# Patient Record
Sex: FEMALE
[De-identification: names, ages, dates, MRNs, and addresses within clinical notes are randomized per-mention and may not be internally consistent; named-entity substitution may affect disease eponyms.]

---

## 2021-06-23 PROBLEM — Z00.00 ENCOUNTER FOR PREVENTIVE HEALTH EXAMINATION: Status: ACTIVE | Noted: 2021-06-23

## 2021-08-12 ENCOUNTER — TRANSCRIPTION ENCOUNTER (OUTPATIENT)
Age: 41
End: 2021-08-12

## 2021-08-12 ENCOUNTER — APPOINTMENT (OUTPATIENT)
Dept: PEDIATRIC ALLERGY IMMUNOLOGY | Facility: CLINIC | Age: 41
End: 2021-08-12
Payer: COMMERCIAL

## 2021-08-12 VITALS
WEIGHT: 158 LBS | HEART RATE: 71 BPM | OXYGEN SATURATION: 98 % | BODY MASS INDEX: 23.4 KG/M2 | SYSTOLIC BLOOD PRESSURE: 115 MMHG | HEIGHT: 69 IN | TEMPERATURE: 96.2 F | DIASTOLIC BLOOD PRESSURE: 79 MMHG

## 2021-08-12 LAB
CD16+CD56+ CELLS # BLD: 144 /UL
CD16+CD56+ CELLS NFR BLD: 8 %
CD19 CELLS NFR BLD: 198 /UL
CD3 CELLS # BLD: 1323 /UL
CD3 CELLS NFR BLD: 79 %
CD3+CD4+ CELLS # BLD: 848 /UL
CD3+CD4+ CELLS NFR BLD: 52 %
CD3+CD4+ CELLS/CD3+CD8+ CLL SPEC: 1.93 RATIO
CD3+CD8+ CELLS # SPEC: 439 /UL
CD3+CD8+ CELLS NFR BLD: 27 %
CELLS.CD3-CD19+/CELLS IN BLOOD: 12 %

## 2021-08-12 PROCEDURE — 36415 COLL VENOUS BLD VENIPUNCTURE: CPT

## 2021-08-12 PROCEDURE — 99204 OFFICE O/P NEW MOD 45 MIN: CPT | Mod: 25

## 2021-08-12 RX ORDER — CETIRIZINE HYDROCHLORIDE 10 MG/1
10 CAPSULE, LIQUID FILLED ORAL
Refills: 0 | Status: ACTIVE | COMMUNITY
Start: 2021-08-12

## 2021-08-12 NOTE — REVIEW OF SYSTEMS
[Fatigue] : fatigue [Eye Itching] : itchy eyes [Nasal Congestion] : nasal congestion [Sneezing] : sneezing [Nl] : Genitourinary

## 2021-08-13 ENCOUNTER — NON-APPOINTMENT (OUTPATIENT)
Age: 41
End: 2021-08-13

## 2021-08-13 LAB
BASOPHILS # BLD AUTO: 0.02 K/UL
BASOPHILS NFR BLD AUTO: 0.4 %
CH50 SERPL-MCNC: 92 U/ML
DEPRECATED KAPPA LC FREE/LAMBDA SER: 0.88 RATIO
EOSINOPHIL # BLD AUTO: 0.17 K/UL
EOSINOPHIL NFR BLD AUTO: 3.2 %
HCT VFR BLD CALC: 40 %
HGB BLD-MCNC: 13.6 G/DL
IGA SER QL IEP: 170 MG/DL
IGG SER QL IEP: 1037 MG/DL
IGM SER QL IEP: 90 MG/DL
IMM GRANULOCYTES NFR BLD AUTO: 0.2 %
KAPPA LC CSF-MCNC: 1.01 MG/DL
KAPPA LC SERPL-MCNC: 0.89 MG/DL
LYMPHOCYTES # BLD AUTO: 1.82 K/UL
LYMPHOCYTES NFR BLD AUTO: 34.3 %
MAN DIFF?: NORMAL
MCHC RBC-ENTMCNC: 30.6 PG
MCHC RBC-ENTMCNC: 34 GM/DL
MCV RBC AUTO: 89.9 FL
MEV IGG FLD QL IA: 150 AU/ML
MEV IGG+IGM SER-IMP: POSITIVE
MONOCYTES # BLD AUTO: 0.4 K/UL
MONOCYTES NFR BLD AUTO: 7.5 %
MUV AB SER-ACNC: POSITIVE
MUV IGG SER QL IA: >300 AU/ML
NEUTROPHILS # BLD AUTO: 2.88 K/UL
NEUTROPHILS NFR BLD AUTO: 54.4 %
PLATELET # BLD AUTO: 259 K/UL
RBC # BLD: 4.45 M/UL
RBC # FLD: 13.3 %
RUBV IGG FLD-ACNC: 5.6 INDEX
RUBV IGG SER-IMP: POSITIVE
VZV AB TITR SER: POSITIVE
VZV IGG SER IF-ACNC: 3187 INDEX
WBC # FLD AUTO: 5.3 K/UL

## 2021-08-13 NOTE — SOCIAL HISTORY
[House] : [unfilled] lives in a house  [Central Forced Air] : heating provided by central forced air [Dry] : dry [Dust Mite Covers] : has dust mite covers [Dog] : dog [] :  [Spouse/Partner] : spouse/partner [College] : College [Dehumidifier] : does not use a dehumidifier [Feather Pillows] : does not have feather pillows [Feather Comforter] : does not have a feather comforter [Smokers in Household] : there are no smokers in the home [de-identified] : [place], with just chickens at the moment [de-identified] : just area rugs [de-identified] : shorthaired  [de-identified] : hypoallergienic detergents

## 2021-08-13 NOTE — PHYSICAL EXAM
[Alert] : alert [Well Nourished] : well nourished [Healthy Appearance] : healthy appearance [No Acute Distress] : no acute distress [Well Developed] : well developed [Normal Pupil & Iris Size/Symmetry] : normal pupil and iris size and symmetry [No Discharge] : no discharge [No Photophobia] : no photophobia [Sclera Not Icteric] : sclera not icteric [Normal TMs] : both tympanic membranes were normal [Normal Nasal Mucosa] : the nasal mucosa was normal [Normal Lips/Tongue] : the lips and tongue were normal [Normal Outer Ear/Nose] : the ears and nose were normal in appearance [Normal Tonsils] : normal tonsils [No Thrush] : no thrush [Supple] : the neck was supple [Normal Rate and Effort] : normal respiratory rhythm and effort [No Crackles] : no crackles [No Retractions] : no retractions [Bilateral Audible Breath Sounds] : bilateral audible breath sounds [Normal Rate] : heart rate was normal  [Normal S1, S2] : normal S1 and S2 [No murmur] : no murmur [Regular Rhythm] : with a regular rhythm [Soft] : abdomen soft [Not Tender] : non-tender [Not Distended] : not distended [No HSM] : no hepato-splenomegaly [Normal Cervical Lymph Nodes] : cervical [Skin Intact] : skin intact  [No Rash] : no rash [No Skin Lesions] : no skin lesions [No clubbing] : no clubbing [No Edema] : no edema [No Cyanosis] : no cyanosis [Normal Mood] : mood was normal [Normal Affect] : affect was normal [Alert, Awake, Oriented as Age-Appropriate] : alert, awake, oriented as age appropriate [No Motor Deficits] : the motor exam was normal [Conjunctival Erythema] : no conjunctival erythema [Suborbital Bogginess] : no suborbital bogginess (allergic shiners) [Pale mucosa] : no pale mucosa [Boggy Nasal Turbinates] : no boggy and/or pale nasal turbinates [Wheezing] : no wheezing was heard

## 2021-08-13 NOTE — REASON FOR VISIT
[Initial Consultation] : an initial consultation for [Immune Evaluation] : immune evaluation [FreeTextEntry2] : immune evaluation

## 2021-08-13 NOTE — CONSULT LETTER
[Dear  ___] : Dear  [unfilled], [Consult Letter:] : I had the pleasure of evaluating your patient, [unfilled]. [Please see my note below.] : Please see my note below. [Consult Closing:] : Thank you very much for allowing me to participate in the care of this patient.  If you have any questions, please do not hesitate to contact me. [Sincerely,] : Sincerely, [FreeTextEntry3] : Desiree Baumann MD, MPH\par Fellow, Division of Allergy and Immunology\par [facility]\par \par Wang Sanchez III  MPH, MD, PhD, FACP, FACAAI, FAAAAI \par , Departments of Medicine and Pediatrics \par [facility] \par , [facility] \par Attending Physician, Division of Allergy & Immunology [facility]\par \par \par \par [facility]

## 2021-08-13 NOTE — HISTORY OF PRESENT ILLNESS
[Dust Mites] : dust mites [Dog] : dog [Cat] : cat [Trees] : trees [Grasses] : grasses [Weeds] : weeds [de-identified] : CASSIE MCLAIN is a 40 year year old female with PCOS, unknown cause infertility, who presents for  evaluation of immune system. She was referred by a friend.\par francisco As a child, around 8yo, had chicken pox. Allergies started after that, specifically to yellow dye number 5, which has since resolved. Received allergy shots for environmental allergies, but did not really help. Had an albuterol nebulizer when younger, but never diagnosed with asthma. Had mononucleosis in college, after which started to be allergic to cats and dogs and developed sensitivity to gluten. Within hours of days of eating gluten, she gets sinus infections. Required antibiotics about every 6 weeks for sinusitis, but ever since cutting out gluten 3 years ago, only takes antibiotics about once a year.  In 2007, had sinus surgery for recurrent sinusitis, and still follows with ENT, Dr. Marcio Lopez ([place]).  \par \andrew Has seen an allergist as an adult, about 10 years ago. Tried again to take allergy shots, but did not work. Was retested earlier this year with multiple environmental allergies, records to be sent over. Gets itchy, runny eyes, itchy throat with environmental allergies. Gets hives from dogs. \par Medication allergies - avalox (mixofloxacin, muscle weakness), pneumonia vaccine (2010; lymph nodes swelled up to golf ball size for 5 weeks, eventually self-resolved), Allegra (misses periods the month she takes it, same reaction in mother), ganarelex (IVF medications, localized swelling, redness, eye/ear swelling, itching). Has never had an epi pen, has never gone to ED for allergic symptoms. Will take benadryl at home and sleep off any symptoms. \par \par Infection history:\par Recurrent sinusitis, including one episode of strep B and fungal infection in her sinuses, that required IV abx.\andrew Used to get frequent yeast infections, several times a year, Resolved after stopped eating gluten. \andrew Had thrombophlebitis years ago, resolved with antibiotics.  \par \andrew Has PCOS. Had 1 known miscarriage, then tried IVF with successful retrievals. Has not yet been able to successfully carry IVF through to implantation stage. \par \andrew Has not seen a GI doctor. Gets bloating after eating gluten. No cardiologist. Does have palpitations, chest pain only while on IVF medications and hormones. Resolved once off. Has not seen a rheumatologist. Knees or shoulder pain occasionally. \par Has not seen a neurologist. Denies dizziness, flushing, nausea, vomiting, diarrhea. \par \par High risk COVID19 vaccine questions:\par Unsure if allergic to components of COVID vaccine\par Unsure if ever exposed to miralax (PEG) or polysorbate\par reaction to prior vaccines (pneumonia) - cervical lymphadenopathy, "golf ball sized" that persisted 5 weeks\par No known reactions to injectables or fillers \par \par Mom with many allergies to food, drugs, environmental, she had asthma as a child. Brother with allergies, twice he had severe pneumonia they think was precipitated by allergies.  [de-identified] : gluten, sugar

## 2021-08-14 LAB
C DIPHTHERIAE AB SER QL: 0.19 IU/ML
C TETANI IGG SER-ACNC: 6.24 IU/ML
HAEM INFLU B AB SER-MCNC: 4.36 UG/ML

## 2021-08-16 ENCOUNTER — NON-APPOINTMENT (OUTPATIENT)
Age: 41
End: 2021-08-16

## 2021-08-18 LAB
COMPLEMENT, ALTERNATE PATHWAY (AH50): 106
DEPRECATED S PNEUM 1 IGG SER-MCNC: 28.9 MCG/ML
DEPRECATED S PNEUM12 AB SER-ACNC: 5.4 MCG/ML
DEPRECATED S PNEUM14 AB SER-ACNC: 21.7 MCG/ML
DEPRECATED S PNEUM17 IGG SER IA-MCNC: 2.4 MCG/ML
DEPRECATED S PNEUM18 IGG SER IA-MCNC: 3.1 MCG/ML
DEPRECATED S PNEUM19 IGG SER-MCNC: 15.2 MCG/ML
DEPRECATED S PNEUM19 IGG SER-MCNC: 4.2 MCG/ML
DEPRECATED S PNEUM2 IGG SER-MCNC: 15.8 MCG/ML
DEPRECATED S PNEUM20 IGG SER-MCNC: 1.4 MCG/ML
DEPRECATED S PNEUM22 IGG SER-MCNC: 31.5 MCG/ML
DEPRECATED S PNEUM23 AB SER-ACNC: 36.9 MCG/ML
DEPRECATED S PNEUM3 AB SER-ACNC: <0.4 MCG/ML
DEPRECATED S PNEUM34 IGG SER-MCNC: 4.9 MCG/ML
DEPRECATED S PNEUM4 AB SER-ACNC: 1.1 MCG/ML
DEPRECATED S PNEUM5 IGG SER-MCNC: 24.7 MCG/ML
DEPRECATED S PNEUM6 IGG SER-MCNC: 1.3 MCG/ML
DEPRECATED S PNEUM7 IGG SER-ACNC: 2.7 MCG/ML
DEPRECATED S PNEUM8 AB SER-ACNC: 2.6 MCG/ML
DEPRECATED S PNEUM9 AB SER-ACNC: NORMAL
DEPRECATED S PNEUM9 IGG SER-MCNC: 3.7 MCG/ML
LPT PW BLD-NRATE: NORMAL
STREPTOCOCCUS PNEUMONIAE SEROTYPE 11A: 5.4 MCG/ML
STREPTOCOCCUS PNEUMONIAE SEROTYPE 15B: 3.1 MCG/ML
STREPTOCOCCUS PNEUMONIAE SEROTYPE 33F: 1.7 MCG/ML

## 2021-08-19 LAB — IGE SER-MCNC: 111 KU/L

## 2021-08-23 ENCOUNTER — NON-APPOINTMENT (OUTPATIENT)
Age: 41
End: 2021-08-23

## 2021-08-24 LAB — MANNAN BINDING LECTIN (MBL): >4000 NG/ML

## 2021-08-25 ENCOUNTER — TRANSCRIPTION ENCOUNTER (OUTPATIENT)
Age: 41
End: 2021-08-25

## 2021-08-26 ENCOUNTER — APPOINTMENT (OUTPATIENT)
Dept: PEDIATRIC ALLERGY IMMUNOLOGY | Facility: CLINIC | Age: 41
End: 2021-08-26
Payer: COMMERCIAL

## 2021-08-26 DIAGNOSIS — Z13.0 ENCOUNTER FOR SCREENING FOR OTHER SUSPECTED ENDOCRINE DISORDER: ICD-10-CM

## 2021-08-26 DIAGNOSIS — Z13.29 ENCOUNTER FOR SCREENING FOR OTHER SUSPECTED ENDOCRINE DISORDER: ICD-10-CM

## 2021-08-26 DIAGNOSIS — Z13.228 ENCOUNTER FOR SCREENING FOR OTHER SUSPECTED ENDOCRINE DISORDER: ICD-10-CM

## 2021-08-26 PROCEDURE — 36415 COLL VENOUS BLD VENIPUNCTURE: CPT

## 2021-08-27 PROBLEM — Z13.29 SCREENING FOR ENDOCRINE, METABOLIC AND IMMUNITY DISORDER: Status: ACTIVE | Noted: 2021-08-12

## 2021-09-01 ENCOUNTER — APPOINTMENT (OUTPATIENT)
Dept: PEDIATRIC ALLERGY IMMUNOLOGY | Facility: CLINIC | Age: 41
End: 2021-09-01
Payer: COMMERCIAL

## 2021-09-01 DIAGNOSIS — J30.9 ALLERGIC RHINITIS, UNSPECIFIED: ICD-10-CM

## 2021-09-01 DIAGNOSIS — Z71.89 OTHER SPECIFIED COUNSELING: ICD-10-CM

## 2021-09-01 PROCEDURE — 99213 OFFICE O/P EST LOW 20 MIN: CPT | Mod: 95

## 2021-09-03 ENCOUNTER — APPOINTMENT (OUTPATIENT)
Dept: PEDIATRIC PULMONARY CYSTIC FIB | Facility: CLINIC | Age: 41
End: 2021-09-03

## 2021-09-03 ENCOUNTER — NON-APPOINTMENT (OUTPATIENT)
Age: 41
End: 2021-09-03

## 2021-09-19 PROBLEM — Z71.89 VACCINE COUNSELING: Status: ACTIVE | Noted: 2021-08-12

## 2021-09-19 PROBLEM — J30.9 ALLERGIC RHINITIS: Status: ACTIVE | Noted: 2021-08-12

## 2021-09-19 NOTE — HISTORY OF PRESENT ILLNESS
[de-identified] : Kyung is a 40 year old woman with PCOS, infertility issues\par \par Work is mandating vaccine by 10/1.\par \par 1. Do you have a history of a severe allergic reaction to an injectable medication (intravenous, intramuscular, or subcutaneous)?\par IVF med - ganirelix (ganerelix acetate, mannitol, glacial acetic acid, water) - subQ - had a reaction the first time - took it a few more times and reaction kept getting worse even with premeds. Reaction was sinus swelling ,inner ear itching throat and roof of mouth itching, localized redness at the injection site. Treated with Benadryl. Did not improve much with Benadryl - pt says she just fell asleep.\par 2. Do you have a history of a severe allergic reaction to a prior vaccine?\par YES - pneumovax - 2009 - on the side of the vaccine her lymph nodes swelled for 6 weeks right before she was scheduled for surgery. LN swelled quickly (maybe the next day but not sure). No hives, itching, swelling that she can recall but it's been a while.\par Has deferred all vaccines since 2009.\par Prior to this the flu vaccine made her miserable. \par 3. Do you have a history of a severe allergic reaction to another allergen (e.g., food, venom, or latex)?\par Food dye - yellow food coloring - throat closure sensation\par No other severe allergic reactions.\par 4. Do you have a history of a severe allergic reaction to polyethylene glycol (PEG), a polysorbate or polyoxyl 35 castor oil (e.g. paclitaxel) containing injectable or vaccine? \par Never took Miralax - so unsure\par \par Fungal sinusitis - took antifungals.

## 2021-09-20 ENCOUNTER — NON-APPOINTMENT (OUTPATIENT)
Age: 41
End: 2021-09-20

## 2021-09-21 ENCOUNTER — NON-APPOINTMENT (OUTPATIENT)
Age: 41
End: 2021-09-21

## 2021-12-17 ENCOUNTER — APPOINTMENT (OUTPATIENT)
Dept: RHEUMATOLOGY | Facility: CLINIC | Age: 41
End: 2021-12-17